# Patient Record
Sex: FEMALE | Race: WHITE | URBAN - METROPOLITAN AREA
[De-identification: names, ages, dates, MRNs, and addresses within clinical notes are randomized per-mention and may not be internally consistent; named-entity substitution may affect disease eponyms.]

---

## 2024-06-07 ENCOUNTER — PATIENT OUTREACH (OUTPATIENT)
Dept: CARE COORDINATION | Facility: CLINIC | Age: 29
End: 2024-06-07

## 2024-06-07 NOTE — PROGRESS NOTES
[facility]  ED Visit 6/6/2024  C/O: Allergic Reaction  Dx: Anaphylaxis  Discharge epi pen kit and prednisone    Outreach call to patient to support a smooth transition of care from recent admission.  Left voicemail message for patient with my contact information.  Will continue to monitor through transition period.    Nikki Bergman RN/CM  [facility]  [phone]

## 2024-06-07 NOTE — PROGRESS NOTES
[facility]  ED Visit 6/6/2024  C/O: Allergic Reaction  Dx: Anaphylaxis  Discharge epi pen kit and prednisone    Patient returned CM outreach call. Patient states, she is doing good today. Patient states, she is not sure what she had a reaction too. Patient states, she has not done anything out of the ordinary over the past month. Patient states, she is going to follow up with an Allergist. Reviewed discharge medications. Patient with no needs. CM will close case.    Nikki Bergman RN/CM  [facility]  [phone]